# Patient Record
Sex: FEMALE | Race: BLACK OR AFRICAN AMERICAN
[De-identification: names, ages, dates, MRNs, and addresses within clinical notes are randomized per-mention and may not be internally consistent; named-entity substitution may affect disease eponyms.]

---
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
## 2019-04-14 ENCOUNTER — HOSPITAL ENCOUNTER (EMERGENCY)
Dept: HOSPITAL 62 - ER | Age: 19
Discharge: HOME | End: 2019-04-14
Payer: MEDICAID

## 2019-04-14 VITALS — DIASTOLIC BLOOD PRESSURE: 79 MMHG | SYSTOLIC BLOOD PRESSURE: 133 MMHG

## 2019-04-14 DIAGNOSIS — M79.672: Primary | ICD-10-CM

## 2019-04-14 DIAGNOSIS — M25.572: ICD-10-CM

## 2019-04-14 DIAGNOSIS — E66.01: ICD-10-CM

## 2019-04-14 DIAGNOSIS — R60.0: ICD-10-CM
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
## 2019-04-14 DIAGNOSIS — M25.562: ICD-10-CM

## 2019-04-14 PROCEDURE — 73630 X-RAY EXAM OF FOOT: CPT

## 2019-04-14 PROCEDURE — 99283 EMERGENCY DEPT VISIT LOW MDM: CPT

## 2019-04-14 PROCEDURE — 73590 X-RAY EXAM OF LOWER LEG: CPT

## 2019-04-14 PROCEDURE — 73564 X-RAY EXAM KNEE 4 OR MORE: CPT

## 2019-04-14 NOTE — RADIOLOGY REPORT (SQ)
EXAM DESCRIPTION:  KNEE LEFT 4 VIEW



COMPLETED DATE/TIME:  4/14/2019 7:01 pm



REASON FOR STUDY:  fall pain



COMPARISON:  None.



NUMBER OF VIEWS:  Four views.



TECHNIQUE:  AP, lateral, and both oblique radiographic images acquired of the left knee.



LIMITATIONS:  None.



FINDINGS:  MINERALIZATION: Normal.

BONES: No acute fracture or dislocation.  No worrisome bone lesions.

JOINT: No effusion.

SOFT TISSUES: No soft tissue swelling.  No radio-opaque foreign body.

OTHER: No other significant finding.



IMPRESSION:  NEGATIVE STUDY OF THE LEFT KNEE. NO RADIOGRAPHIC EVIDENCE OF ACUTE INJURY.



TECHNICAL DOCUMENTATION:  JOB ID:  [id number]

 2011 [employer]- All Rights Reserved



Reading location - IP/workstation name: [email or web address]

## 2019-04-14 NOTE — RADIOLOGY REPORT (SQ)
EXAM DESCRIPTION:  TIBIA FIBULA LEFT



COMPLETED DATE/TIME:  4/14/2019 6:18 pm



REASON FOR STUDY:  fall



COMPARISON:  None.



NUMBER OF VIEWS:  Two views.



TECHNIQUE:  Two radiographic images acquired of the left tibia and fibula to include the knee and ank
le in at least one projection.



LIMITATIONS:  None.



FINDINGS:  MINERALIZATION: Normal.

BONES: No acute fracture or dislocation.

SOFT TISSUES: No obvious swelling or radiopaque foreign body.



IMPRESSION:  Noradiographic evidence for acute fracture of the left tibia or fibula.



TECHNICAL DOCUMENTATION:  JOB ID:  [id number]

OH-[id number]

 2011 [employer]- All Rights Reserved



Reading location - IP/workstation name: [email or web address]

## 2019-04-14 NOTE — ER DOCUMENT REPORT
Addendum entered and electronically signed by NESTOR RUSSELL FNP  04/15/19 

00:00: 








Discharge





- Discharge


Clinical Impression: 


 Left foot pain, Morbid obesity





Fall


Qualifiers:


 Encounter type: initial encounter Qualified Code(s): W19.XXXA - Unspecified 

fall, initial encounter





Left knee pain


Qualifiers:


 Chronicity: acute Qualified Code(s): M25.562 - Pain in left knee





Condition: Stable


Disposition: HOME, SELF-CARE


Instructions:  Ice & Elevation ([facility]), Sprained Knee ([facility])


Additional Instructions: 


You were seen today in the emergency department after a fall.  You did sprain 

your ankle.  You may have pain for the next few days.  Rest the area, ice the 

areas(20 minutes on, 20 minutes off), elevate your leg, and wear an Ace wrap as 

needed for swelling.  You can take Tylenol 1000 mg every 6 hours as needed for 

your pain.  You have been prescribed naproxen for your pain.  Take as 

prescribed.  Please follow-up with your primary care provider in regards to this

visit.


Prescriptions: 


Naproxen 500 mg PO BID #60 tablet





Original Note:








HPI





- HPI


Time Seen by Provider: 04/14/19 17:24


Pain Level: 5


Context: 





Patient is a 19-year-old morbidly obese female who presents the emergency 

department with a chief complaint of right foot pain and knee pain.  10 minutes 

prior to arrival, she fell down through her porch and hurt her left foot and 

knee.  She states that she is having a hard time moving her toes.  She has not 

taken any medication to help with the pain.  Denies any past medical history.  

Denies any medication use.





- ROS


Systems Reviewed and Negative: Yes All other systems reviewed and negative





- REPRODUCTIVE


Reproductive: DENIES: Pregnant:





- MUSCULOSKELETAL


Musculoskeletal: REPORTS: Extremity pain - Left foot, ankle, and knee.





Past Medical History





- Social History


Smoking Status: Never Smoker


Frequency of alcohol use: None


Drug Abuse: None


Family History: Reviewed & Not Pertinent


Renal/ Medical History: Denies: Hx Peritoneal Dialysis





- Immunizations


Immunizations up to date: Yes





Vertical Provider Document





- CONSTITUTIONAL


Agree With Documented VS: Yes


Exam Limitations: No Limitations


General Appearance: No Apparent Distress





- INFECTION CONTROL


TRAVEL OUTSIDE OF THE [place] IN LAST 30 DAYS: No





- HEENT


HEENT: Atraumatic, Normocephalic





- NECK


Neck: Normal Inspection





- RESPIRATORY


Respiratory: Breath Sounds Normal, No Respiratory Distress





- CARDIOVASCULAR


Cardiovascular: Regular Rate, Regular Rhythm


Pulses: Normal: Radial, Posterior tibial, Dorsalis pedis





- MUSCULOSKELETAL/EXTREMETIES


Musculoskeletal/Extremeties: Tender - Left toes, lateral knee, Edema - Very mild

edema.  negative: Eccymosis





- NEURO


Level of Consciousness: Awake, Alert, Appropriate


Motor/Sensory: No Motor Deficit, No Sensory Deficit





- DERM


Integumentary: Warm, Dry





Course





- Re-evaluation


Re-evalutation: 





04/14/19 19:20


Patient's left knee, left tibia/fibula, and left foot are negative for any 

fractures at this time.  I suspect the patient did have some soft injury from 

her fall.  I do not suspect any tendon injury.  Unfortunately due to the 

patient's weight, we are unable to provide her crutches.  I wrote a hand 

prescription for a walker if the patient needs assistance with walking.   She 

will be placed in an Ace wrap. She was able to walk for me and favored her left 

side while walking on her right heel.  I had a very lengthy conversation with 

the patient on the importance of loosing weight and that her weight is 

concerning for other co-morbidities.  She states she will follow up with a 

primary care provider to have basic labs drawn.  She is in agreement with this 

plan.  Verbal discharge instructions were given to the patient.  They verbalized

understanding.  They are stable for discharge.











- Vital Signs


Vital signs: 


                                        











Temp Pulse Resp BP Pulse Ox


 


 97.8 F   102 H  16   130/87 H  97 


 


 04/14/19 17:14  04/14/19 17:14  04/14/19 17:14  04/14/19 17:14  04/14/19 17:14














Discharge





- Discharge


Clinical Impression: 


 Left foot pain





Fall


Qualifiers:


 Encounter type: initial encounter Qualified Code(s): W19.XXXA - Unspecified 

fall, initial encounter





Left knee pain


Qualifiers:


 Chronicity: acute Qualified Code(s): M25.562 - Pain in left knee





Condition: Stable


Disposition: HOME, SELF-CARE


Instructions:  Ice & Elevation ([facility]), Sprained Knee ([facility])


Additional Instructions: 


You were seen today in the emergency department after a fall.  You did sprain 

your ankle.  You may have pain for the next few days.  Rest the area, ice the 

areas(20 minutes on, 20 minutes off), elevate your leg, and wear an Ace wrap as 

needed for swelling.  You can take Tylenol 1000 mg every 6 hours as needed for 

your pain.  You have been prescribed naproxen for your pain.  Take as 

prescribed.  Please follow-up with your primary care provider in regards to this

visit.


Prescriptions: 


Naproxen 500 mg PO BID #60 tablet

## 2019-04-14 NOTE — RADIOLOGY REPORT (SQ)
EXAM DESCRIPTION:  FOOT LEFT COMPLETE



COMPLETED DATE/TIME:  4/14/2019 6:18 pm



REASON FOR STUDY:  fall through the porch.  Pain at the toes.



COMPARISON:  None.



NUMBER OF VIEWS:  Three views.



TECHNIQUE:  AP, lateral and oblique  radiographic images acquired of the left foot.



LIMITATIONS:  None.



FINDINGS:  MINERALIZATION: Normal.

BONES: No acute fracture or dislocation.  Incidental note is made of an accessory navicular bone.

SOFT TISSUES: No soft tissue swelling.  No radiopaque foreign body.



IMPRESSION:  No radiographic evidence for acute fracture of the left foot.



TECHNICAL DOCUMENTATION:  JOB ID:  [id number]

OH-[id number]

 2011 [employer]- All Rights Reserved



Reading location - IP/workstation name: [email or web address]